# Patient Record
Sex: FEMALE | Race: WHITE | Employment: OTHER | URBAN - METROPOLITAN AREA
[De-identification: names, ages, dates, MRNs, and addresses within clinical notes are randomized per-mention and may not be internally consistent; named-entity substitution may affect disease eponyms.]

---

## 2020-07-13 ENCOUNTER — HOSPITAL ENCOUNTER (EMERGENCY)
Age: 65
Discharge: HOME | End: 2020-07-13
Attending: EMERGENCY MEDICINE | Admitting: EMERGENCY MEDICINE
Payer: COMMERCIAL

## 2020-07-13 VITALS
HEIGHT: 66 IN | SYSTOLIC BLOOD PRESSURE: 141 MMHG | WEIGHT: 249.78 LBS | OXYGEN SATURATION: 97 % | RESPIRATION RATE: 14 BRPM | DIASTOLIC BLOOD PRESSURE: 69 MMHG | TEMPERATURE: 98.5 F | HEART RATE: 92 BPM | BODY MASS INDEX: 40.14 KG/M2

## 2020-07-13 DIAGNOSIS — E86.0 DEHYDRATION: ICD-10-CM

## 2020-07-13 DIAGNOSIS — T67.5XXA HEAT EXHAUSTION, INITIAL ENCOUNTER: Primary | ICD-10-CM

## 2020-07-13 DIAGNOSIS — R55 SYNCOPE AND COLLAPSE: ICD-10-CM

## 2020-07-13 LAB
ALBUMIN SERPL-MCNC: 3.5 G/DL (ref 3.5–5)
ALBUMIN/GLOB SERPL: 0.8 {RATIO} (ref 1.1–2.2)
ALP SERPL-CCNC: 107 U/L (ref 45–117)
ALT SERPL-CCNC: 22 U/L (ref 12–78)
ANION GAP SERPL CALC-SCNC: 6 MMOL/L (ref 5–15)
AST SERPL-CCNC: 13 U/L (ref 15–37)
BILIRUB SERPL-MCNC: 0.3 MG/DL (ref 0.2–1)
BUN SERPL-MCNC: 12 MG/DL (ref 6–20)
BUN/CREAT SERPL: 10 (ref 12–20)
CALCIUM SERPL-MCNC: 8.5 MG/DL (ref 8.5–10.1)
CHLORIDE SERPL-SCNC: 106 MMOL/L (ref 97–108)
CO2 SERPL-SCNC: 27 MMOL/L (ref 21–32)
CREAT SERPL-MCNC: 1.15 MG/DL (ref 0.55–1.02)
ERYTHROCYTE [DISTWIDTH] IN BLOOD BY AUTOMATED COUNT: 13.2 % (ref 11.5–14.5)
GLOBULIN SER CALC-MCNC: 4.2 G/DL (ref 2–4)
GLUCOSE SERPL-MCNC: 153 MG/DL (ref 65–100)
HCT VFR BLD AUTO: 38.5 % (ref 35–47)
HGB BLD-MCNC: 12.7 G/DL (ref 11.5–16)
MCH RBC QN AUTO: 27 PG (ref 26–34)
MCHC RBC AUTO-ENTMCNC: 33 G/DL (ref 30–36.5)
MCV RBC AUTO: 81.9 FL (ref 80–99)
NRBC # BLD: 0 K/UL (ref 0–0.01)
NRBC BLD-RTO: 0 PER 100 WBC
PLATELET # BLD AUTO: 288 K/UL (ref 150–400)
PMV BLD AUTO: 10.1 FL (ref 8.9–12.9)
POTASSIUM SERPL-SCNC: 3.5 MMOL/L (ref 3.5–5.1)
PROT SERPL-MCNC: 7.7 G/DL (ref 6.4–8.2)
RBC # BLD AUTO: 4.7 M/UL (ref 3.8–5.2)
SODIUM SERPL-SCNC: 139 MMOL/L (ref 136–145)
TROPONIN I SERPL-MCNC: <0.05 NG/ML
WBC # BLD AUTO: 12.1 K/UL (ref 3.6–11)

## 2020-07-13 PROCEDURE — 96360 HYDRATION IV INFUSION INIT: CPT

## 2020-07-13 PROCEDURE — 74011250636 HC RX REV CODE- 250/636: Performed by: EMERGENCY MEDICINE

## 2020-07-13 PROCEDURE — 96361 HYDRATE IV INFUSION ADD-ON: CPT

## 2020-07-13 PROCEDURE — 85027 COMPLETE CBC AUTOMATED: CPT

## 2020-07-13 PROCEDURE — 80053 COMPREHEN METABOLIC PANEL: CPT

## 2020-07-13 PROCEDURE — 99285 EMERGENCY DEPT VISIT HI MDM: CPT

## 2020-07-13 PROCEDURE — 75810000293 HC SIMP/SUPERF WND  RPR

## 2020-07-13 PROCEDURE — 93005 ELECTROCARDIOGRAM TRACING: CPT

## 2020-07-13 PROCEDURE — 36415 COLL VENOUS BLD VENIPUNCTURE: CPT

## 2020-07-13 PROCEDURE — 74011000250 HC RX REV CODE- 250: Performed by: EMERGENCY MEDICINE

## 2020-07-13 PROCEDURE — 84484 ASSAY OF TROPONIN QUANT: CPT

## 2020-07-13 RX ORDER — LIDOCAINE HYDROCHLORIDE AND EPINEPHRINE 20; 10 MG/ML; UG/ML
1.5 INJECTION, SOLUTION INFILTRATION; PERINEURAL ONCE
Status: COMPLETED | OUTPATIENT
Start: 2020-07-13 | End: 2020-07-13

## 2020-07-13 RX ADMIN — LIDOCAINE HYDROCHLORIDE,EPINEPHRINE BITARTRATE 30 MG: 20; .01 INJECTION, SOLUTION INFILTRATION; PERINEURAL at 21:49

## 2020-07-13 RX ADMIN — SODIUM CHLORIDE 1000 ML: 900 INJECTION, SOLUTION INTRAVENOUS at 21:49

## 2020-07-14 LAB
ATRIAL RATE: 80 BPM
CALCULATED P AXIS, ECG09: 49 DEGREES
CALCULATED R AXIS, ECG10: 71 DEGREES
CALCULATED T AXIS, ECG11: 15 DEGREES
DIAGNOSIS, 93000: NORMAL
P-R INTERVAL, ECG05: 124 MS
Q-T INTERVAL, ECG07: 376 MS
QRS DURATION, ECG06: 88 MS
QTC CALCULATION (BEZET), ECG08: 433 MS
VENTRICULAR RATE, ECG03: 80 BPM

## 2020-07-14 NOTE — ED PROVIDER NOTES
EMERGENCY DEPARTMENT HISTORY AND PHYSICAL EXAM      Date: 7/13/2020  Patient Name: Raven Tapia  Patient Age and Sex: 60 y.o. female     History of Presenting Illness     Chief Complaint   Patient presents with    Syncope     pt had syncopal episode while on toilet at [employer], ETOH on board; pt reports she was in heat today; denies chest pain, denies n/v; denies vision changes; A&Ox4    Laceration     2-3 inches above right eye per EMS; pt does NOT wear contacts; bleeding controlled       History Provided By: Patient    HPI: Raven Tapia old female with a history of vasovagal syncope presenting for syncope and laceration to the forehead. According to patient, she was at [employer] and had just finished a jumbo Bria while sitting in the heat on the patio when she started to feel very hot. She decided to go inside to cool off so she went inside and then went to the bathroom. While sitting on the toilet she became very lightheaded and next thing you know she passed out onto the floor. Patient suffered a laceration to her forehead. She states that her tetanus shot was 2 years ago. Currently having some mild headache but denies any pain when moving her arms or legs. Patient is not on a blood thinner. Patient states that she does have vasovagal syncope with pain. Denies being in pain when she passed out. She denies any chest pain or shortness of breath associated with the syncopal episode. There are no other complaints, changes, or physical findings at this time. PCP: Joseph Morris MD    No current facility-administered medications on file prior to encounter. No current outpatient medications on file prior to encounter. Past History     Past Medical History:  History reviewed. No pertinent past medical history. Past Surgical History:  History reviewed. No pertinent surgical history. Family History:  History reviewed. No pertinent family history.     Social History:  Social History     Tobacco Use    Smoking status: Not on file   Substance Use Topics    Alcohol use: Not on file    Drug use: Not on file       Allergies: Allergies   Allergen Reactions    Codeine Nausea Only         Review of Systems   Review of Systems   Constitutional: Positive for diaphoresis. Negative for chills and fever. Respiratory: Negative for cough and shortness of breath. Cardiovascular: Negative for chest pain. Gastrointestinal: Negative for abdominal pain, constipation, diarrhea, nausea and vomiting. Genitourinary: Negative for dysuria, frequency and hematuria. Skin: Positive for wound. Neurological: Positive for syncope and light-headedness. Negative for weakness and numbness. All other systems reviewed and are negative. Physical Exam   Physical Exam  Vitals signs and nursing note reviewed. Constitutional:       Appearance: She is well-developed. HENT:      Head: Normocephalic and atraumatic. Eyes:      Conjunctiva/sclera: Conjunctivae normal.   Neck:      Musculoskeletal: Normal range of motion and neck supple. Cardiovascular:      Rate and Rhythm: Normal rate and regular rhythm. Pulmonary:      Effort: Pulmonary effort is normal. No respiratory distress. Breath sounds: Normal breath sounds. Abdominal:      General: There is no distension. Palpations: Abdomen is soft. Tenderness: There is no abdominal tenderness. Musculoskeletal: Normal range of motion. Skin:     General: Skin is warm and dry. Comments: Over patient's right eyebrow there is a 4 cm laceration linear   Neurological:      General: No focal deficit present. Mental Status: She is alert and oriented to person, place, and time. Mental status is at baseline.    Psychiatric:         Mood and Affect: Mood normal.          Diagnostic Study Results     Labs -     Recent Results (from the past 12 hour(s))   CBC W/O DIFF    Collection Time: 07/13/20  8:59 PM   Result Value Ref Range    WBC 12.1 (H) 3.6 - 11.0 K/uL    RBC 4.70 3.80 - 5.20 M/uL    HGB 12.7 11.5 - 16.0 g/dL    HCT 38.5 35.0 - 47.0 %    MCV 81.9 80.0 - 99.0 FL    MCH 27.0 26.0 - 34.0 PG    MCHC 33.0 30.0 - 36.5 g/dL    RDW 13.2 11.5 - 14.5 %    PLATELET 353 693 - 715 K/uL    MPV 10.1 8.9 - 12.9 FL    NRBC 0.0 0  WBC    ABSOLUTE NRBC 0.00 0.00 - 9.22 K/uL   METABOLIC PANEL, COMPREHENSIVE    Collection Time: 07/13/20  8:59 PM   Result Value Ref Range    Sodium 139 136 - 145 mmol/L    Potassium 3.5 3.5 - 5.1 mmol/L    Chloride 106 97 - 108 mmol/L    CO2 27 21 - 32 mmol/L    Anion gap 6 5 - 15 mmol/L    Glucose 153 (H) 65 - 100 mg/dL    BUN 12 6 - 20 MG/DL    Creatinine 1.15 (H) 0.55 - 1.02 MG/DL    BUN/Creatinine ratio 10 (L) 12 - 20      GFR est AA 58 (L) >60 ml/min/1.73m2    GFR est non-AA 48 (L) >60 ml/min/1.73m2    Calcium 8.5 8.5 - 10.1 MG/DL    Bilirubin, total 0.3 0.2 - 1.0 MG/DL    ALT (SGPT) 22 12 - 78 U/L    AST (SGOT) 13 (L) 15 - 37 U/L    Alk. phosphatase 107 45 - 117 U/L    Protein, total 7.7 6.4 - 8.2 g/dL    Albumin 3.5 3.5 - 5.0 g/dL    Globulin 4.2 (H) 2.0 - 4.0 g/dL    A-G Ratio 0.8 (L) 1.1 - 2.2     TROPONIN I    Collection Time: 07/13/20  8:59 PM   Result Value Ref Range    Troponin-I, Qt. <0.05 <0.05 ng/mL       Radiologic Studies -   No orders to display     CT Results  (Last 48 hours)    None        CXR Results  (Last 48 hours)    None            Medical Decision Making   I am the first provider for this patient. I reviewed the vital signs, available nursing notes, past medical history, past surgical history, family history and social history. Vital Signs-Reviewed the patient's vital signs. Patient Vitals for the past 12 hrs:   Temp Pulse Resp BP SpO2   07/13/20 2027 98.5 °F (36.9 °C) 83 16 131/59 96 %       Records Reviewed: Nursing Notes and Old Medical Records    Provider Notes (Medical Decision Making):   Patient presenting with syncopal episode today with laceration over forehead. Regarding the syncope, most likely related to the heat plus alcohol plus already low threshold for syncope. Likely dehydration. Less likely ACS, PE or electrolyte issue but will make sure given her age. Regarding the laceration, tetanus is up-to-date. No indication for CT of the head given that she is oriented completely and moving all 4 extremities without any difficulty. Finger-to-nose all intact. Will need sutures. ED Course:   Initial assessment performed. The patients presenting problems have been discussed, and they are in agreement with the care plan formulated and outlined with them. I have encouraged them to ask questions as they arise throughout their visit. ED Course as of Jul 13 2251 Mon Jul 13, 2020 2200 Procedure Note - Laceration Repair:  10:00 PM  Procedure by JENNIFER Olivas. Complexity: Complex  6cm curved laceration to Rt forehead superior to eyebrow  was irrigated copiously with NS under jet lavage, prepped with Betadine and draped in a sterile fashion. The area was anesthetized with 5 mLs of  Lidocaine 2% with epinephrine via local infiltration. The wound was explored with the following results: No foreign bodies found, No tendon laceration seen. The wound was repaired with One layer suture closure: Skin Layer:  7 sutures placed, stitch type:simple interrupted, suture: 6-0 nylon. .  The wound was closed with good hemostasis and approximation. Sterile dressing applied. Estimated blood loss: 0  The procedure took 16-30 minutes, and pt tolerated well    [SM]      ED Course User Index  [SM] Linden Addison PA-C     Critical Care Time:   0    Disposition:  Discharge Note:  The patient has been re-evaluated and is ready for discharge. Reviewed available results with patient. Counseled patient on diagnosis and care plan. Patient has expressed understanding, and all questions have been answered.  Patient agrees with plan and agrees to follow up as recommended, or to return to the ED if their symptoms worsen. Discharge instructions have been provided and explained to the patient, along with reasons to return to the ED. PLAN:  There are no discharge medications for this patient. 2.   Follow-up Information     Follow up With Specialties Details Why Contact Info    [facility] EMERGENCY DEPT Emergency Medicine  If symptoms worsen [address] [place] [id number] [phone]        3. Return to ED if worse     Diagnosis     Clinical Impression:   1. Heat exhaustion, initial encounter    2. Syncope and collapse    3. Dehydration        Attestations:    Rikki Gaviria M.D. Please note that this dictation was completed with [employer], the computer voice recognition software. Quite often unanticipated grammatical, syntax, homophones, and other interpretive errors are inadvertently transcribed by the computer software. Please disregard these errors. Please excuse any errors that have escaped final proofreading. Thank you.

## 2020-07-14 NOTE — DISCHARGE INSTRUCTIONS
Patient Education        Heat Exhaustion: Care Instructions  Your Care Instructions  Heat exhaustion occurs when you are hot, sweat a lot, and do not drink enough to replace the lost fluids. Heat exhaustion is not the same as heatstroke, which is much more serious. Heatstroke can lead to problems with many different organs and can be life-threatening. After medical care for heat exhaustion, you will need to limit your activities and take good care of your body while it recovers. Follow-up care is a key part of your treatment and safety. Be sure to make and go to all appointments, and call your doctor if you are having problems. It's also a good idea to know your test results and keep a list of the medicines you take. How can you care for yourself at home? · Reduce your activities, and get plenty of rest. Your doctor will give you instructions on when you can resume your normal schedule. · Stay in a cool room for at least the next 24 hours. · Drink rehydration drinks, juices, and water to replace fluids. Drinks such as sports drinks that contain electrolytes work best, because they have salt and minerals. You need salt and minerals as well as water. You are drinking enough fluids when your urine is normal in color (light yellow or clear), and you are urinating every 2 to 4 hours. If you have kidney, heart, or liver disease and have to limit fluids or salt, talk with your doctor before you increase your fluid or salt intake. · Avoid drinks that have caffeine or alcohol. To prevent heat exhaustion  · Drink plenty of fluids, enough so that your urine is light yellow or clear like water. If you have kidney, heart, or liver disease and have to limit fluids, talk with your doctor before you increase the amount of fluids you drink. · Drink plenty of water before, during, and after you are active. This is very important when it is hot out and when you do intense exercise.   · During hot weather, wear light-colored clothing that fits loosely and a hat with a brim to reflect the sun. · Limit or avoid strenuous activity during hot or humid weather, especially during the hottest part of the day (10 a.m. to 4 p.m.). Heat exhaustion and heatstroke usually develop when you are working or exercising in hot weather. Humidity makes hot weather even more dangerous. · Cars can get very hot inside. Open the windows or turn on the air conditioning before you get in and close the doors. · Try to stay cool during hot weather. If your home is not air-conditioned, seek an air-conditioned place. That could be in [employer], a neighborhood café, or a friend's home. [place] yourself with a cool mist. Take a cool shower, bath, or sponge bath. · Be aware that some medicines, such as major tranquilizers, can raise the risk of heat exhaustion. Ask your doctor whether any medicine you take raises your chance of getting heat exhaustion. When should you call for help? [id number] anytime you think you may need emergency care. For example, call if:  · You feel very hot and:  ? You have a seizure. ? You feel confused. ? Your skin is red, hot, and dry. ? You passed out (lost consciousness). Call your doctor now or seek immediate medical care if:  · You cannot keep fluids down. · After returning to your normal activities, you have symptoms of heat exhaustion, such as sweating a lot, fatigue, dizziness, or nausea. Watch closely for changes in your health, and be sure to contact your doctor if:  · You do not get better as expected. Where can you learn more? Go to http://jona-clay.info/  Enter S222 in the search box to learn more about \"Heat Exhaustion: Care Instructions. \"  Current as of: June 26, 2019               Content Version: 12.5  © 8859-4719 [employer]. Care instructions adapted under license by [employer] (which disclaims liability or warranty for this information).  If you have questions about a medical condition or this instruction, always ask your healthcare professional. [place] [address] any warranty or liability for your use of this information.

## 2020-07-14 NOTE — ED NOTES
Rodríguez Gar MD reviewed discharge instructions with the patient. The patient verbalized understanding. All questions and concerns were addressed. The patient is discharged ambulatory with instructions and prescriptions in hand. Pt is alert and oriented x 4. Respirations are clear and unlabored.

## 2020-07-14 NOTE — ED NOTES
Bedside and Verbal shift change report given to Bin Vang (oncoming nurse) by Wei Cooper (offgoing nurse). Report included the following information SBAR, ED Summary, Intake/Output, MAR and Recent Results.